# Patient Record
Sex: FEMALE | Race: WHITE | Employment: OTHER | URBAN - METROPOLITAN AREA
[De-identification: names, ages, dates, MRNs, and addresses within clinical notes are randomized per-mention and may not be internally consistent; named-entity substitution may affect disease eponyms.]

---

## 2017-01-03 ENCOUNTER — LAB ENCOUNTER (OUTPATIENT)
Dept: LAB | Facility: HOSPITAL | Age: 82
End: 2017-01-03
Attending: INTERNAL MEDICINE

## 2017-01-03 ENCOUNTER — PRIOR ORIGINAL RECORDS (OUTPATIENT)
Dept: OTHER | Age: 82
End: 2017-01-03

## 2017-01-03 DIAGNOSIS — R03.0 ELEVATED BLOOD PRESSURE READING WITHOUT DIAGNOSIS OF HYPERTENSION: ICD-10-CM

## 2017-01-03 DIAGNOSIS — E78.00 PURE HYPERCHOLESTEROLEMIA: Primary | ICD-10-CM

## 2017-01-03 LAB
ALBUMIN SERPL-MCNC: 3.5 G/DL (ref 3.5–4.8)
ALP LIVER SERPL-CCNC: 107 U/L (ref 55–142)
ALT SERPL-CCNC: 18 U/L (ref 14–54)
AST SERPL-CCNC: 7 U/L (ref 15–41)
BILIRUB SERPL-MCNC: 0.4 MG/DL (ref 0.1–2)
BUN BLD-MCNC: 17 MG/DL (ref 8–20)
CALCIUM BLD-MCNC: 8.7 MG/DL (ref 8.3–10.3)
CHLORIDE: 100 MMOL/L (ref 101–111)
CHOLEST SMN-MCNC: 134 MG/DL (ref ?–200)
CO2: 31 MMOL/L (ref 22–32)
CREAT BLD-MCNC: 0.73 MG/DL (ref 0.55–1.02)
GLUCOSE BLD-MCNC: 124 MG/DL (ref 70–99)
HDLC SERPL-MCNC: 53 MG/DL (ref 45–?)
HDLC SERPL: 2.53 {RATIO} (ref ?–4.44)
LDLC SERPL CALC-MCNC: 62 MG/DL (ref ?–130)
M PROTEIN MFR SERPL ELPH: 6.6 G/DL (ref 6.1–8.3)
NONHDLC SERPL-MCNC: 81 MG/DL (ref ?–130)
POTASSIUM SERPL-SCNC: 4.3 MMOL/L (ref 3.6–5.1)
SODIUM SERPL-SCNC: 136 MMOL/L (ref 136–144)
TRIGLYCERIDES: 93 MG/DL (ref ?–150)
VLDL: 19 MG/DL (ref 5–40)

## 2017-01-03 PROCEDURE — 36415 COLL VENOUS BLD VENIPUNCTURE: CPT

## 2017-01-03 PROCEDURE — 80053 COMPREHEN METABOLIC PANEL: CPT

## 2017-01-03 PROCEDURE — 80061 LIPID PANEL: CPT

## 2017-01-05 LAB
ALBUMIN: 3.5 G/DL
ALKALINE PHOSPHATATE(ALK PHOS): 107 IU/L
BILIRUBIN TOTAL: 0.4 MG/DL
BUN: 17 MG/DL
CALCIUM: 8.7 MG/DL
CHLORIDE: 100 MEQ/L
CHOLESTEROL, TOTAL: 134 MG/DL
CREATININE, SERUM: 0.73 MG/DL
GLUCOSE: 124 MG/DL
HDL CHOLESTEROL: 53 MG/DL
LDL CHOLESTEROL: 62 MG/DL
POTASSIUM, SERUM: 4.3 MEQ/L
PROTEIN, TOTAL: 6.6 G/DL
SGOT (AST): 7 IU/L
SGPT (ALT): 18 IU/L
SODIUM: 136 MEQ/L
TRIGLYCERIDES: 93 MG/DL

## 2017-02-09 ENCOUNTER — DIABETIC EDUCATION (OUTPATIENT)
Dept: ENDOCRINOLOGY CLINIC | Facility: CLINIC | Age: 82
End: 2017-02-09

## 2017-02-09 VITALS
SYSTOLIC BLOOD PRESSURE: 131 MMHG | WEIGHT: 173 LBS | HEART RATE: 83 BPM | BODY MASS INDEX: 27.15 KG/M2 | HEIGHT: 67 IN | DIASTOLIC BLOOD PRESSURE: 75 MMHG

## 2017-02-09 DIAGNOSIS — E11.49 DIABETES MELLITUS TYPE 2 WITH NEUROLOGICAL MANIFESTATIONS (HCC): Primary | ICD-10-CM

## 2017-02-09 PROCEDURE — G0108 DIAB MANAGE TRN  PER INDIV: HCPCS | Performed by: DIETITIAN, REGISTERED

## 2017-02-09 NOTE — PATIENT INSTRUCTIONS
Date: 2/9/2017      Ht 67\"  Wt 173 lb  BMI 27.09 kg/m2  Weight Change:  Down 15 lb      HGBA1C (%)   Date Value   07/19/2014 6.7*   ----------   (HbA1c reference range:</= 7%)    Healthy Eating:  Accomplishment: Cut down on portions of food at meals; stop

## 2017-02-09 NOTE — PROGRESS NOTES
Jairo Wall  [id number] attended Step 4 Diabetic Education:    Date: 2/9/2017  Start time: 1:00   End time: 2:00      /75 mmHg  Pulse 83  Ht 67\"  Wt 173 lb  BMI 27.09 kg/m2  Weight Change:  Down 15 lb       HGBA1C (%)   Date Value   07/19

## 2017-02-09 NOTE — PROGRESS NOTES
Magan Ding  [id number] attended Step 4 Diabetic Education:    Date: 2/9/2017  Start time:  End time: ***      Ht 67\"  Wt 173 lb  BMI 27.09 kg/m2  Weight Change:  Down 15 lb      HGBA1C (%)   Date Value   07/19/2014 6.7*   ----------   (HbA1c r

## 2017-02-20 ENCOUNTER — TELEPHONE (OUTPATIENT)
Dept: SURGERY | Facility: CLINIC | Age: 82
End: 2017-02-20

## 2017-02-20 DIAGNOSIS — I67.1 ANEURYSM OF INTERNAL CAROTID ARTERY: Primary | ICD-10-CM

## 2017-02-20 NOTE — TELEPHONE ENCOUNTER
Per 's LOV on 3/7/16: \"1.  Elvina Mcburney continues to do well neurologically.  The aneurysms remain stable.  The intracranial atherosclerotic stenosis of the right MCA appears unchanged.   2.  I have asked her to return for an annual visit next year,

## 2017-02-20 NOTE — TELEPHONE ENCOUNTER
Patient informed of message below and that she will be due for f/u imaging prior to her 3/10/17 appointment. Patient understood and was thankful.

## 2017-03-07 ENCOUNTER — HOSPITAL ENCOUNTER (OUTPATIENT)
Dept: MRI IMAGING | Facility: HOSPITAL | Age: 82
Discharge: HOME | End: 2017-03-07
Attending: RADIOLOGY
Payer: MEDICARE

## 2017-03-07 DIAGNOSIS — I67.1 ANEURYSM OF INTERNAL CAROTID ARTERY: ICD-10-CM

## 2017-03-07 PROCEDURE — 70544 MR ANGIOGRAPHY HEAD W/O DYE: CPT

## 2017-03-10 ENCOUNTER — OFFICE VISIT (OUTPATIENT)
Dept: SURGERY | Facility: CLINIC | Age: 82
End: 2017-03-10

## 2017-03-10 VITALS — DIASTOLIC BLOOD PRESSURE: 80 MMHG | SYSTOLIC BLOOD PRESSURE: 140 MMHG | RESPIRATION RATE: 14 BRPM | HEART RATE: 80 BPM

## 2017-03-10 DIAGNOSIS — I67.1 ANEURYSM OF INTERNAL CAROTID ARTERY: Primary | ICD-10-CM

## 2017-03-10 PROBLEM — R13.10 DYSPHAGIA: Status: ACTIVE | Noted: 2017-03-10

## 2017-03-10 PROBLEM — R19.7 DIARRHEA: Status: ACTIVE | Noted: 2017-03-10

## 2017-03-10 PROCEDURE — 99214 OFFICE O/P EST MOD 30 MIN: CPT | Performed by: RADIOLOGY

## 2017-03-10 NOTE — PATIENT INSTRUCTIONS
Refill policies:    • Allow 2 business days for refills; controlled substances may take longer.   • Contact your pharmacy at least 5 days prior to running out of medication and have them send an electronic request or submit request through the “request re your physician has recommended that you have a procedure or additional testing performed. [facility]) will contact your insurance carrier to obtain pre-certification or prior authorization.     Unfortunately, CHRISTINA has seen an increas

## 2017-03-10 NOTE — PROGRESS NOTES
3/10/2017      Dear Carlee Egan,  I had the pleasure of seeing Katy Nayak today,3/10/2017   , for her annual follow-up of known intracranial aneurysms arising from the left internal carotid artery.   As you well remember, Katy Nayak is a 80 year right pelvis   • Carotid artery, internal, occlusion      right carotid artery 50% occluded   • Arthritis      left knee   • TIA (transient ischemic attack)           Past Surgical History    CATARACT      Comment bilateral cataract removed with IOL    THY file        Alcohol Use: Yes 0.0 oz/week 0 Standard drinks or equivalent per week   Comment: 10 drinks per year         PHYSICAL EXAM   /80 mmHg  Pulse 80  Resp 14 /80 mmHg  Pulse 80  Resp 14    General appearance: 54-year-old  woman, return in 1 year with repeat carotid Dopplers as her most recent carotid Dopplers are from 2015. No Follow-up on file.

## 2017-03-10 NOTE — PROGRESS NOTES
Review of Systems:    Hand Dominance: right  General: no symptoms reported  Neuro: no symptoms reported  Head: no symptoms reported  Musculoskeletal: no symptoms reported  Cardiovascular: no symptoms reported  Gastrointestinal: no symptoms reported  Genito

## 2017-12-11 ENCOUNTER — MYAURORA ACCOUNT LINK (OUTPATIENT)
Dept: OTHER | Age: 82
End: 2017-12-11

## 2017-12-11 ENCOUNTER — PRIOR ORIGINAL RECORDS (OUTPATIENT)
Dept: OTHER | Age: 82
End: 2017-12-11

## 2017-12-21 RX ORDER — SODIUM CHLORIDE, SODIUM LACTATE, POTASSIUM CHLORIDE, CALCIUM CHLORIDE 600; 310; 30; 20 MG/100ML; MG/100ML; MG/100ML; MG/100ML
INJECTION, SOLUTION INTRAVENOUS CONTINUOUS
Status: CANCELLED | OUTPATIENT
Start: 2017-12-21

## 2018-01-11 ENCOUNTER — ANESTHESIA EVENT (OUTPATIENT)
Dept: ENDOSCOPY | Facility: HOSPITAL | Age: 83
End: 2018-01-11
Payer: MEDICARE

## 2018-01-11 ENCOUNTER — HOSPITAL ENCOUNTER (OUTPATIENT)
Facility: HOSPITAL | Age: 83
Setting detail: HOSPITAL OUTPATIENT SURGERY
Discharge: HOME | End: 2018-01-11
Attending: INTERNAL MEDICINE | Admitting: INTERNAL MEDICINE
Payer: MEDICARE

## 2018-01-11 ENCOUNTER — SURGERY (OUTPATIENT)
Age: 83
End: 2018-01-11

## 2018-01-11 ENCOUNTER — ANESTHESIA (OUTPATIENT)
Dept: ENDOSCOPY | Facility: HOSPITAL | Age: 83
End: 2018-01-11
Payer: MEDICARE

## 2018-01-11 VITALS
HEIGHT: 65.5 IN | WEIGHT: 160 LBS | RESPIRATION RATE: 16 BRPM | SYSTOLIC BLOOD PRESSURE: 136 MMHG | DIASTOLIC BLOOD PRESSURE: 72 MMHG | BODY MASS INDEX: 26.34 KG/M2 | TEMPERATURE: 99 F | OXYGEN SATURATION: 99 % | HEART RATE: 66 BPM

## 2018-01-11 DIAGNOSIS — R19.5 OCCULT BLOOD IN STOOLS: ICD-10-CM

## 2018-01-11 DIAGNOSIS — D64.9 ANEMIA, UNSPECIFIED TYPE: ICD-10-CM

## 2018-01-11 PROCEDURE — 0DBK8ZX EXCISION OF ASCENDING COLON, VIA NATURAL OR ARTIFICIAL OPENING ENDOSCOPIC, DIAGNOSTIC: ICD-10-PCS | Performed by: INTERNAL MEDICINE

## 2018-01-11 PROCEDURE — 0DB98ZX EXCISION OF DUODENUM, VIA NATURAL OR ARTIFICIAL OPENING ENDOSCOPIC, DIAGNOSTIC: ICD-10-PCS | Performed by: INTERNAL MEDICINE

## 2018-01-11 PROCEDURE — 0DB78ZX EXCISION OF STOMACH, PYLORUS, VIA NATURAL OR ARTIFICIAL OPENING ENDOSCOPIC, DIAGNOSTIC: ICD-10-PCS | Performed by: INTERNAL MEDICINE

## 2018-01-11 PROCEDURE — 88312 SPECIAL STAINS GROUP 1: CPT | Performed by: INTERNAL MEDICINE

## 2018-01-11 PROCEDURE — 0DB68ZX EXCISION OF STOMACH, VIA NATURAL OR ARTIFICIAL OPENING ENDOSCOPIC, DIAGNOSTIC: ICD-10-PCS | Performed by: INTERNAL MEDICINE

## 2018-01-11 PROCEDURE — 0DB48ZX EXCISION OF ESOPHAGOGASTRIC JUNCTION, VIA NATURAL OR ARTIFICIAL OPENING ENDOSCOPIC, DIAGNOSTIC: ICD-10-PCS | Performed by: INTERNAL MEDICINE

## 2018-01-11 PROCEDURE — 88305 TISSUE EXAM BY PATHOLOGIST: CPT | Performed by: INTERNAL MEDICINE

## 2018-01-11 PROCEDURE — 0DBP8ZX EXCISION OF RECTUM, VIA NATURAL OR ARTIFICIAL OPENING ENDOSCOPIC, DIAGNOSTIC: ICD-10-PCS | Performed by: INTERNAL MEDICINE

## 2018-01-11 PROCEDURE — 0DB18ZX EXCISION OF UPPER ESOPHAGUS, VIA NATURAL OR ARTIFICIAL OPENING ENDOSCOPIC, DIAGNOSTIC: ICD-10-PCS | Performed by: INTERNAL MEDICINE

## 2018-01-11 RX ORDER — SODIUM CHLORIDE, SODIUM LACTATE, POTASSIUM CHLORIDE, CALCIUM CHLORIDE 600; 310; 30; 20 MG/100ML; MG/100ML; MG/100ML; MG/100ML
INJECTION, SOLUTION INTRAVENOUS CONTINUOUS
Status: DISCONTINUED | OUTPATIENT
Start: 2018-01-11 | End: 2018-01-11

## 2018-01-11 RX ORDER — FLUCONAZOLE 100 MG/1
200 TABLET ORAL ONCE
Qty: 15 TABLET | Refills: 0 | Status: SHIPPED | OUTPATIENT
Start: 2018-01-11 | End: 2018-01-11

## 2018-01-11 NOTE — OPERATIVE REPORT
EGD operative report  Patient Name: Yuliana Quintana  Procedure: Esophagogastroduodenoscopy with biopsy   Indication: Esophageal dysphagia  Attending: Mery Brown M.D.   Consent:  The risks, benefits, and alternatives were discussed with the patient impression were appreciated at 43 cm from the incisors. Biopsies were obtained from the distal and proximal esophagus to evaluate for Eosinophilic Esophagitis. Stomach:   The gastric body, antrum, fundus, cardia, and angularis were normal, without mass

## 2018-01-11 NOTE — H&P
[address] Patient Status:  Hospital Outpatient Surgery    10/11/1933 MRN [id number]   [facility] ENDOSCOPY Attending Carlos Donovan MD   Hosp Day # 0 PCP Justine Fields MD     Histor not use drugs.     Ciprofloxacin           Diarrhea  Lactose                 Diarrhea  Sulfa Antibiotics       Rash, Other (See Comments)    Comment:Rapid heart beat      lactated ringers infusion  Intravenous Continuous     No current outpatient prescripti anxiety, history of physical or sexual abuse, stress, history of eating disorder. Skin: Denies severe itching, unusual moles, rash, flushing, change in hair or nails. Bone/joint: Denies arthritis, back pain, joint pain, osteoporosis.   Heme/Lymphatic: Kranthi Vargas

## 2018-01-11 NOTE — ANESTHESIA PREPROCEDURE EVALUATION
PRE-OP EVALUATION    Patient Name: Jacey Carty    Pre-op Diagnosis: Occult blood in stools [R19.5]  Anemia, unspecified type [D64.9]    Procedure(s):  ESOPHAGOGASTRODUODENOSCOPY , COLONOSCOPY      Surgeon(s) and Role:     * Mukesh Hanson MD - Pr hypothyroidism                       Pulmonary    Negative pulmonary ROS.                        Neuro/Psych          (+) CVA   (+) TIA                       Past Surgical History:  No date: CATARACT      Comment: bilateral cataract removed with IOL  3/14/2

## 2018-01-11 NOTE — OR NURSING
Patient left AVS on bedside table when she was discharged. Called to notify patient and  of this. Reminded patient to  prescriptions at her pharmacy as Dr Arlene Lopez instructed and to call his office if there are any issues with this.  AVS will be

## 2018-01-11 NOTE — ANESTHESIA POSTPROCEDURE EVALUATION
[address] [facility] Patient Status:  Hospital Outpatient Surgery   Age/Gender 80year old female MRN [id number]   Location [facility]. Attending Niya Muniz MD   Hosp Day # 0 PCP Liv Ji MD       Anesthesia Po

## 2018-01-11 NOTE — OPERATIVE REPORT
Colon operative report  Patient Name: Ramón Gutierrez  Procedure: Colonoscopy with snare polypectomy  Indication: Heme occult (+) stool  Date of last colonoscopy: 3/2014 - 2 tubular adenomata and hemorrhoids were identified.   This procedure was performe reached the rectum, it was placed in a retroflexed position, and the rectal bulb was thus visualized. The endoscope was righted, and air was suctioned from the colon to the best of my ability, as it was during withdrawn from the colon.   The endoscope was

## 2018-03-14 ENCOUNTER — TELEPHONE (OUTPATIENT)
Dept: SURGERY | Facility: CLINIC | Age: 83
End: 2018-03-14

## 2018-03-14 DIAGNOSIS — I67.1 ANEURYSM OF INTERNAL CAROTID ARTERY: Primary | ICD-10-CM

## 2018-03-14 DIAGNOSIS — I72.0 CAROTID ARTERY ANEURYSM (HCC): ICD-10-CM

## 2018-03-20 ENCOUNTER — HOSPITAL ENCOUNTER (OUTPATIENT)
Dept: ULTRASOUND IMAGING | Facility: HOSPITAL | Age: 83
Discharge: HOME | End: 2018-03-20
Attending: RADIOLOGY
Payer: MEDICARE

## 2018-03-20 DIAGNOSIS — I67.1 ANEURYSM OF INTERNAL CAROTID ARTERY: ICD-10-CM

## 2018-03-20 DIAGNOSIS — I72.0 CAROTID ARTERY ANEURYSM (HCC): ICD-10-CM

## 2018-03-20 PROCEDURE — 93880 EXTRACRANIAL BILAT STUDY: CPT | Performed by: RADIOLOGY

## 2018-03-22 ENCOUNTER — LAB ENCOUNTER (OUTPATIENT)
Dept: LAB | Age: 83
End: 2018-03-22
Attending: INTERNAL MEDICINE
Payer: MEDICARE

## 2018-03-22 DIAGNOSIS — E78.00 PURE HYPERCHOLESTEROLEMIA: ICD-10-CM

## 2018-03-22 DIAGNOSIS — I10 ESSENTIAL HYPERTENSION, MALIGNANT: Primary | ICD-10-CM

## 2018-03-22 LAB
ALBUMIN SERPL-MCNC: 3.6 G/DL (ref 3.5–4.8)
ALP LIVER SERPL-CCNC: 100 U/L (ref 55–142)
ALT SERPL-CCNC: 17 U/L (ref 14–54)
AST SERPL-CCNC: 14 U/L (ref 15–41)
BILIRUB SERPL-MCNC: 0.5 MG/DL (ref 0.1–2)
BUN BLD-MCNC: 15 MG/DL (ref 8–20)
CALCIUM BLD-MCNC: 9.3 MG/DL (ref 8.3–10.3)
CHLORIDE: 101 MMOL/L (ref 101–111)
CHOLEST SMN-MCNC: 125 MG/DL (ref ?–200)
CO2: 29 MMOL/L (ref 22–32)
CREAT BLD-MCNC: 0.73 MG/DL (ref 0.55–1.02)
GLUCOSE BLD-MCNC: 107 MG/DL (ref 70–99)
HDLC SERPL-MCNC: 59 MG/DL (ref 45–?)
HDLC SERPL: 2.12 {RATIO} (ref ?–4.44)
LDLC SERPL CALC-MCNC: 52 MG/DL (ref ?–130)
M PROTEIN MFR SERPL ELPH: 6.8 G/DL (ref 6.1–8.3)
NONHDLC SERPL-MCNC: 66 MG/DL (ref ?–130)
POTASSIUM SERPL-SCNC: 4.5 MMOL/L (ref 3.6–5.1)
SODIUM SERPL-SCNC: 137 MMOL/L (ref 136–144)
TRIGL SERPL-MCNC: 72 MG/DL (ref ?–150)
VLDLC SERPL CALC-MCNC: 14 MG/DL (ref 5–40)

## 2018-03-22 PROCEDURE — 80053 COMPREHEN METABOLIC PANEL: CPT

## 2018-03-22 PROCEDURE — 36415 COLL VENOUS BLD VENIPUNCTURE: CPT

## 2018-03-22 PROCEDURE — 80061 LIPID PANEL: CPT

## 2018-04-05 ENCOUNTER — OFFICE VISIT (OUTPATIENT)
Dept: SURGERY | Facility: CLINIC | Age: 83
End: 2018-04-05

## 2018-04-05 VITALS — HEART RATE: 76 BPM | RESPIRATION RATE: 16 BRPM | DIASTOLIC BLOOD PRESSURE: 72 MMHG | SYSTOLIC BLOOD PRESSURE: 152 MMHG

## 2018-04-05 DIAGNOSIS — I65.23 ATHEROSCLEROSIS OF BOTH CAROTID ARTERIES: Primary | Chronic | ICD-10-CM

## 2018-04-05 PROCEDURE — 99212 OFFICE O/P EST SF 10 MIN: CPT | Performed by: RADIOLOGY

## 2018-04-05 RX ORDER — ROSUVASTATIN CALCIUM 20 MG/1
1 TABLET, COATED ORAL DAILY
COMMUNITY
Start: 2018-03-15

## 2018-04-05 NOTE — PROGRESS NOTES
Here for follow up with new US for review. Per last OV notes:  Prior carotid Dopplers had demonstrated bilateral carotid plaque with 50% or less stenosis.   I have asked her to return in 1 year with repeat carotid Dopplers as her most recent carotid Doppler
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
## 2018-05-03 ENCOUNTER — APPOINTMENT (OUTPATIENT)
Dept: GENERAL RADIOLOGY | Facility: HOSPITAL | Age: 83
End: 2018-05-03
Attending: EMERGENCY MEDICINE
Payer: MEDICARE

## 2018-05-03 ENCOUNTER — HOSPITAL ENCOUNTER (EMERGENCY)
Facility: HOSPITAL | Age: 83
Discharge: HOME | End: 2018-05-03
Attending: EMERGENCY MEDICINE
Payer: MEDICARE

## 2018-05-03 VITALS
HEIGHT: 65 IN | DIASTOLIC BLOOD PRESSURE: 73 MMHG | SYSTOLIC BLOOD PRESSURE: 179 MMHG | BODY MASS INDEX: 26.16 KG/M2 | OXYGEN SATURATION: 100 % | RESPIRATION RATE: 14 BRPM | WEIGHT: 157 LBS | HEART RATE: 72 BPM | TEMPERATURE: 99 F

## 2018-05-03 DIAGNOSIS — S60.222A CONTUSION OF LEFT HAND, INITIAL ENCOUNTER: ICD-10-CM

## 2018-05-03 DIAGNOSIS — S20.211A CONTUSION OF RIB ON RIGHT SIDE, INITIAL ENCOUNTER: Primary | ICD-10-CM

## 2018-05-03 PROCEDURE — 73110 X-RAY EXAM OF WRIST: CPT | Performed by: EMERGENCY MEDICINE

## 2018-05-03 PROCEDURE — 71046 X-RAY EXAM CHEST 2 VIEWS: CPT | Performed by: EMERGENCY MEDICINE

## 2018-05-03 PROCEDURE — 99284 EMERGENCY DEPT VISIT MOD MDM: CPT

## 2018-05-03 PROCEDURE — 73130 X-RAY EXAM OF HAND: CPT | Performed by: EMERGENCY MEDICINE

## 2018-05-03 RX ORDER — IBUPROFEN 600 MG/1
600 TABLET ORAL ONCE
Status: COMPLETED | OUTPATIENT
Start: 2018-05-03 | End: 2018-05-03

## 2018-05-03 NOTE — ED PROVIDER NOTES
Patient Seen in: [facility] Emergency Department    History   Patient presents with:  Upper Extremity Injury (musculoskeletal)  Trauma (cardiovascular, musculoskeletal)  Fall (musculoskeletal, neurologic)    Stated Complaint: fall/wrist and rib pain of GERBER  2009: THYROIDECTOMY      Comment: Left thryoid 4/2009 right thyroid 7/2009        Smoking status: Former Smoker                                                              Packs/day: 0.30      Years: 12.00        Quit date: 1/1/1972  Smokeless tobac strong. No midline cervical, thoracic, lumbar back pain. Neurological: Pt is alert and oriented to person, place, and time. No cranial nerve deficit. Skin: Skin is warm and dry. Psychiatric: Normal mood and affect.  Thought content normal.    ED C encounter diagnosis)  Contusion of left hand, initial encounter    Disposition:  Discharge  5/3/2018  1:53 pm    Follow-up:  Yany Marie MD  [address] [facility] [place]  [phone]    In 1 week          Medications Prescribed:

## 2018-09-26 ENCOUNTER — TELEPHONE (OUTPATIENT)
Dept: SURGERY | Facility: CLINIC | Age: 83
End: 2018-09-26

## 2019-03-04 ENCOUNTER — TELEPHONE (OUTPATIENT)
Dept: SURGERY | Facility: CLINIC | Age: 84
End: 2019-03-04

## 2019-03-04 DIAGNOSIS — I67.1 CEREBRAL ANEURYSM WITHOUT RUPTURE: Primary | ICD-10-CM

## 2019-03-04 NOTE — TELEPHONE ENCOUNTER
----- Message from Sury Epstein RN sent at 3/14/2018 12:04 PM CDT -----  Regarding: MRA   I have asked her to return in 2 years with repeat MRA for evaluation of these aneurysms

## 2019-03-05 NOTE — TELEPHONE ENCOUNTER
Tried calling pt again and was unable to reach her. It appears she doesn't have a voicemail set up either. I will reach out again tomorrow.

## 2019-03-12 NOTE — TELEPHONE ENCOUNTER
Unable to reach patient. No voicemail set up. Will send letter to inform her that it is time to schedule her f/u mra and f/u ov.

## 2019-04-22 RX ORDER — VALSARTAN AND HYDROCHLOROTHIAZIDE 160; 25 MG/1; MG/1
TABLET ORAL
Qty: 15 TABLET | Refills: 0 | Status: SHIPPED | OUTPATIENT
Start: 2019-04-22

## 2019-04-30 NOTE — TELEPHONE ENCOUNTER
pt was in ER on 1/30/19 in [place] & had CT head/carotid & MRI brain. Pt wants to know if MRA is still needed. Pt will try to mail CDs to Dr Leobardo Espinal. Pt will be back in town 5/29/19-6/9/19. But, Dr Leobardo Espinal is not in our office at that time.  Can she

## 2019-05-17 NOTE — TELEPHONE ENCOUNTER
Spoke with STERLING Giordano who is waiting on recommendations from [address] [facility].

## 2019-05-28 NOTE — TELEPHONE ENCOUNTER
rcvd MR Brain 1/30/19 & cta neck/head 1/30/19 (CD & REPORT). Uploaded into PACS.  Mailed back to patient

## 2019-06-12 NOTE — TELEPHONE ENCOUNTER
Herminia Vega RN discussed Imaging with Dr Blanquita Merino. Per Dr Gabriele Pena looks ok, patient to follow up as needed     Attempted to reach patient on home phone number listed in [phone] [address] and mobile number to relay above.  Phone rings but no answer, unable to

## 2019-06-14 NOTE — TELEPHONE ENCOUNTER
Spoke with patient and informed her of message below. Patient understood.     Patient also stated she moved to [place] no too long ago and January her doctor switched her to ASA 81mg (instead of the 325 mg.) On 1/30/19 patient had TIA and was then put on

## 2021-01-29 DIAGNOSIS — Z23 NEED FOR VACCINATION: ICD-10-CM

## 2023-02-08 NOTE — TELEPHONE ENCOUNTER
Called pt and informed, her   \" 1. have asked her to return in 2 years with repeat MRA for evaluation of these aneurysms. 2.  Prior carotid Dopplers had demonstrated bilateral carotid plaque with 50% or less stenosis.   I have asked her to return in 1 yea 36.1

## 2024-02-06 ENCOUNTER — APPOINTMENT (RX ONLY)
Dept: URBAN - METROPOLITAN AREA CLINIC 153 | Facility: CLINIC | Age: 89
Setting detail: DERMATOLOGY
End: 2024-02-06

## 2024-02-06 DIAGNOSIS — D18.0 HEMANGIOMA: ICD-10-CM

## 2024-02-06 DIAGNOSIS — L82.1 OTHER SEBORRHEIC KERATOSIS: ICD-10-CM

## 2024-02-06 DIAGNOSIS — L81.4 OTHER MELANIN HYPERPIGMENTATION: ICD-10-CM

## 2024-02-06 PROBLEM — D18.01 HEMANGIOMA OF SKIN AND SUBCUTANEOUS TISSUE: Status: ACTIVE | Noted: 2024-02-06

## 2024-02-06 PROCEDURE — 99203 OFFICE O/P NEW LOW 30 MIN: CPT

## 2024-02-06 PROCEDURE — ? COUNSELING

## 2024-02-06 ASSESSMENT — LOCATION DETAILED DESCRIPTION DERM
LOCATION DETAILED: RIGHT ULNAR DORSAL HAND
LOCATION DETAILED: LEFT CENTRAL MALAR CHEEK
LOCATION DETAILED: LEFT CENTRAL TEMPLE
LOCATION DETAILED: RIGHT SUPERIOR FOREHEAD
LOCATION DETAILED: LEFT ULNAR DORSAL HAND
LOCATION DETAILED: RIGHT FOREHEAD
LOCATION DETAILED: RIGHT LATERAL MALAR CHEEK
LOCATION DETAILED: LEFT MEDIAL FOREHEAD
LOCATION DETAILED: LEFT MEDIAL MALAR CHEEK
LOCATION DETAILED: LEFT LATERAL FOREHEAD
LOCATION DETAILED: RIGHT CENTRAL MALAR CHEEK
LOCATION DETAILED: LEFT INFERIOR CENTRAL MALAR CHEEK

## 2024-02-06 ASSESSMENT — LOCATION SIMPLE DESCRIPTION DERM
LOCATION SIMPLE: LEFT FOREHEAD
LOCATION SIMPLE: RIGHT FOREHEAD
LOCATION SIMPLE: LEFT CHEEK
LOCATION SIMPLE: LEFT HAND
LOCATION SIMPLE: LEFT TEMPLE
LOCATION SIMPLE: RIGHT HAND
LOCATION SIMPLE: RIGHT CHEEK

## 2024-02-06 ASSESSMENT — LOCATION ZONE DERM
LOCATION ZONE: HAND
LOCATION ZONE: FACE
